# Patient Record
Sex: FEMALE | Race: WHITE | Employment: FULL TIME | URBAN - METROPOLITAN AREA
[De-identification: names, ages, dates, MRNs, and addresses within clinical notes are randomized per-mention and may not be internally consistent; named-entity substitution may affect disease eponyms.]

---

## 2021-05-29 ENCOUNTER — RECORDS - HEALTHEAST (OUTPATIENT)
Dept: ADMINISTRATIVE | Facility: CLINIC | Age: 51
End: 2021-05-29

## 2022-01-04 ENCOUNTER — ALLIED HEALTH/NURSE VISIT (OUTPATIENT)
Dept: NURSING | Facility: CLINIC | Age: 52
End: 2022-01-04
Payer: COMMERCIAL

## 2022-01-04 ENCOUNTER — TELEPHONE (OUTPATIENT)
Dept: FAMILY MEDICINE | Facility: CLINIC | Age: 52
End: 2022-01-04

## 2022-01-04 VITALS — HEART RATE: 84 BPM | DIASTOLIC BLOOD PRESSURE: 88 MMHG | SYSTOLIC BLOOD PRESSURE: 138 MMHG

## 2022-01-04 DIAGNOSIS — R29.90 NEUROLOGICAL SYMPTOMS: Primary | ICD-10-CM

## 2022-01-04 NOTE — NURSING NOTE
RN called to  for red flag.    Patient stated she had numbness in her left arm.  Mostly in the wrist and hand.    Patient also stated she had pain in the middle of her back.    Numbness has been ongoing for the past several hours.    Patient was on her way to [facility] when she became dizzy and noted the pain in her back.    Patient became anxious and came to clinic.  Patient son was driving her to [place].    Patient very tearful and anxious during visit.    RN reassured patient and patient was able to relax.    Patient VS were stable and WNL.    Patient stated she would proceed to [facility]gent care for further evaluation and work up.  RN agreed with plan.    Shai Shetty RN, BSN, PHN  [facility]